# Patient Record
Sex: MALE | Race: WHITE | HISPANIC OR LATINO | Employment: FULL TIME | URBAN - METROPOLITAN AREA
[De-identification: names, ages, dates, MRNs, and addresses within clinical notes are randomized per-mention and may not be internally consistent; named-entity substitution may affect disease eponyms.]

---

## 2022-06-14 ENCOUNTER — PRE-ADMISSION TESTING (OUTPATIENT)
Dept: ADMISSIONS | Facility: MEDICAL CENTER | Age: 22
End: 2022-06-14
Attending: UROLOGY
Payer: COMMERCIAL

## 2022-06-15 ENCOUNTER — ANESTHESIA (OUTPATIENT)
Dept: SURGERY | Facility: MEDICAL CENTER | Age: 22
End: 2022-06-15
Payer: COMMERCIAL

## 2022-06-15 ENCOUNTER — ANESTHESIA EVENT (OUTPATIENT)
Dept: SURGERY | Facility: MEDICAL CENTER | Age: 22
End: 2022-06-15
Payer: COMMERCIAL

## 2022-06-15 ENCOUNTER — HOSPITAL ENCOUNTER (OUTPATIENT)
Dept: RADIOLOGY | Facility: MEDICAL CENTER | Age: 22
End: 2022-06-15

## 2022-06-15 ENCOUNTER — HOSPITAL ENCOUNTER (OUTPATIENT)
Facility: MEDICAL CENTER | Age: 22
End: 2022-06-15
Attending: UROLOGY | Admitting: UROLOGY
Payer: COMMERCIAL

## 2022-06-15 VITALS
OXYGEN SATURATION: 95 % | RESPIRATION RATE: 16 BRPM | TEMPERATURE: 98.6 F | DIASTOLIC BLOOD PRESSURE: 63 MMHG | WEIGHT: 149.47 LBS | HEIGHT: 69 IN | HEART RATE: 90 BPM | BODY MASS INDEX: 22.14 KG/M2 | SYSTOLIC BLOOD PRESSURE: 125 MMHG

## 2022-06-15 PROBLEM — Q62.39 UPJ OBSTRUCTION, CONGENITAL: Status: ACTIVE | Noted: 2022-06-15

## 2022-06-15 LAB — PATHOLOGY CONSULT NOTE: NORMAL

## 2022-06-15 PROCEDURE — A9270 NON-COVERED ITEM OR SERVICE: HCPCS | Performed by: ANESTHESIOLOGY

## 2022-06-15 PROCEDURE — 700101 HCHG RX REV CODE 250: Performed by: ANESTHESIOLOGY

## 2022-06-15 PROCEDURE — 160009 HCHG ANES TIME/MIN: Performed by: UROLOGY

## 2022-06-15 PROCEDURE — 502714 HCHG ROBOTIC SURGERY SERVICES: Performed by: UROLOGY

## 2022-06-15 PROCEDURE — 00862 ANES XTRPRTL LWR ABD RNL PX: CPT | Performed by: ANESTHESIOLOGY

## 2022-06-15 PROCEDURE — C2617 STENT, NON-COR, TEM W/O DEL: HCPCS | Performed by: UROLOGY

## 2022-06-15 PROCEDURE — C1758 CATHETER, URETERAL: HCPCS | Performed by: UROLOGY

## 2022-06-15 PROCEDURE — 700111 HCHG RX REV CODE 636 W/ 250 OVERRIDE (IP)

## 2022-06-15 PROCEDURE — 700102 HCHG RX REV CODE 250 W/ 637 OVERRIDE(OP): Performed by: ANESTHESIOLOGY

## 2022-06-15 PROCEDURE — 700105 HCHG RX REV CODE 258: Performed by: UROLOGY

## 2022-06-15 PROCEDURE — 160035 HCHG PACU - 1ST 60 MINS PHASE I: Performed by: UROLOGY

## 2022-06-15 PROCEDURE — 700111 HCHG RX REV CODE 636 W/ 250 OVERRIDE (IP): Performed by: ANESTHESIOLOGY

## 2022-06-15 PROCEDURE — 88305 TISSUE EXAM BY PATHOLOGIST: CPT

## 2022-06-15 PROCEDURE — 160025 RECOVERY II MINUTES (STATS): Performed by: UROLOGY

## 2022-06-15 PROCEDURE — 700101 HCHG RX REV CODE 250: Performed by: UROLOGY

## 2022-06-15 PROCEDURE — RXMED WILLOW AMBULATORY MEDICATION CHARGE: Performed by: UROLOGY

## 2022-06-15 PROCEDURE — 160002 HCHG RECOVERY MINUTES (STAT): Performed by: UROLOGY

## 2022-06-15 PROCEDURE — 160048 HCHG OR STATISTICAL LEVEL 1-5: Performed by: UROLOGY

## 2022-06-15 PROCEDURE — 160046 HCHG PACU - 1ST 60 MINS PHASE II: Performed by: UROLOGY

## 2022-06-15 PROCEDURE — 160031 HCHG SURGERY MINUTES - 1ST 30 MINS LEVEL 5: Performed by: UROLOGY

## 2022-06-15 PROCEDURE — 160036 HCHG PACU - EA ADDL 30 MINS PHASE I: Performed by: UROLOGY

## 2022-06-15 PROCEDURE — 700111 HCHG RX REV CODE 636 W/ 250 OVERRIDE (IP): Performed by: UROLOGY

## 2022-06-15 PROCEDURE — 110371 HCHG SHELL REV 272: Performed by: UROLOGY

## 2022-06-15 PROCEDURE — 160042 HCHG SURGERY MINUTES - EA ADDL 1 MIN LEVEL 5: Performed by: UROLOGY

## 2022-06-15 PROCEDURE — C1769 GUIDE WIRE: HCPCS | Performed by: UROLOGY

## 2022-06-15 DEVICE — STENT UROLOGICAL POLARIS 6X28  ULTRA: Type: IMPLANTABLE DEVICE | Site: URETER | Status: FUNCTIONAL

## 2022-06-15 RX ORDER — ONDANSETRON 2 MG/ML
4 INJECTION INTRAMUSCULAR; INTRAVENOUS
Status: DISCONTINUED | OUTPATIENT
Start: 2022-06-15 | End: 2022-06-15 | Stop reason: HOSPADM

## 2022-06-15 RX ORDER — HYDRALAZINE HYDROCHLORIDE 20 MG/ML
5 INJECTION INTRAMUSCULAR; INTRAVENOUS
Status: DISCONTINUED | OUTPATIENT
Start: 2022-06-15 | End: 2022-06-15 | Stop reason: HOSPADM

## 2022-06-15 RX ORDER — BUPIVACAINE HYDROCHLORIDE AND EPINEPHRINE 5; 5 MG/ML; UG/ML
INJECTION, SOLUTION EPIDURAL; INTRACAUDAL; PERINEURAL
Status: DISCONTINUED | OUTPATIENT
Start: 2022-06-15 | End: 2022-06-15 | Stop reason: HOSPADM

## 2022-06-15 RX ORDER — MEPERIDINE HYDROCHLORIDE 25 MG/ML
12.5 INJECTION INTRAMUSCULAR; INTRAVENOUS; SUBCUTANEOUS
Status: DISCONTINUED | OUTPATIENT
Start: 2022-06-15 | End: 2022-06-15 | Stop reason: HOSPADM

## 2022-06-15 RX ORDER — MIDAZOLAM HYDROCHLORIDE 1 MG/ML
1 INJECTION INTRAMUSCULAR; INTRAVENOUS
Status: DISCONTINUED | OUTPATIENT
Start: 2022-06-15 | End: 2022-06-15 | Stop reason: HOSPADM

## 2022-06-15 RX ORDER — SODIUM CHLORIDE, SODIUM LACTATE, POTASSIUM CHLORIDE, CALCIUM CHLORIDE 600; 310; 30; 20 MG/100ML; MG/100ML; MG/100ML; MG/100ML
INJECTION, SOLUTION INTRAVENOUS CONTINUOUS
Status: DISCONTINUED | OUTPATIENT
Start: 2022-06-15 | End: 2022-06-15 | Stop reason: HOSPADM

## 2022-06-15 RX ORDER — METOPROLOL TARTRATE 1 MG/ML
1 INJECTION, SOLUTION INTRAVENOUS
Status: DISCONTINUED | OUTPATIENT
Start: 2022-06-15 | End: 2022-06-15 | Stop reason: HOSPADM

## 2022-06-15 RX ORDER — OXYCODONE HCL 5 MG/5 ML
5 SOLUTION, ORAL ORAL
Status: COMPLETED | OUTPATIENT
Start: 2022-06-15 | End: 2022-06-15

## 2022-06-15 RX ORDER — HYDROMORPHONE HYDROCHLORIDE 1 MG/ML
0.1 INJECTION, SOLUTION INTRAMUSCULAR; INTRAVENOUS; SUBCUTANEOUS
Status: DISCONTINUED | OUTPATIENT
Start: 2022-06-15 | End: 2022-06-15 | Stop reason: HOSPADM

## 2022-06-15 RX ORDER — HYDROMORPHONE HYDROCHLORIDE 1 MG/ML
0.4 INJECTION, SOLUTION INTRAMUSCULAR; INTRAVENOUS; SUBCUTANEOUS
Status: DISCONTINUED | OUTPATIENT
Start: 2022-06-15 | End: 2022-06-15 | Stop reason: HOSPADM

## 2022-06-15 RX ORDER — IPRATROPIUM BROMIDE AND ALBUTEROL SULFATE 2.5; .5 MG/3ML; MG/3ML
3 SOLUTION RESPIRATORY (INHALATION)
Status: DISCONTINUED | OUTPATIENT
Start: 2022-06-15 | End: 2022-06-15 | Stop reason: HOSPADM

## 2022-06-15 RX ORDER — DIPHENHYDRAMINE HYDROCHLORIDE 50 MG/ML
12.5 INJECTION INTRAMUSCULAR; INTRAVENOUS
Status: DISCONTINUED | OUTPATIENT
Start: 2022-06-15 | End: 2022-06-15 | Stop reason: HOSPADM

## 2022-06-15 RX ORDER — KETOROLAC TROMETHAMINE 30 MG/ML
INJECTION, SOLUTION INTRAMUSCULAR; INTRAVENOUS PRN
Status: DISCONTINUED | OUTPATIENT
Start: 2022-06-15 | End: 2022-06-15 | Stop reason: SURG

## 2022-06-15 RX ORDER — ROCURONIUM BROMIDE 10 MG/ML
INJECTION, SOLUTION INTRAVENOUS PRN
Status: DISCONTINUED | OUTPATIENT
Start: 2022-06-15 | End: 2022-06-15 | Stop reason: SURG

## 2022-06-15 RX ORDER — SODIUM CHLORIDE, SODIUM LACTATE, POTASSIUM CHLORIDE, CALCIUM CHLORIDE 600; 310; 30; 20 MG/100ML; MG/100ML; MG/100ML; MG/100ML
INJECTION, SOLUTION INTRAVENOUS CONTINUOUS
Status: ACTIVE | OUTPATIENT
Start: 2022-06-15 | End: 2022-06-15

## 2022-06-15 RX ORDER — IBUPROFEN 200 MG
200 TABLET ORAL EVERY 6 HOURS PRN
COMMUNITY

## 2022-06-15 RX ORDER — CEFAZOLIN SODIUM 1 G/3ML
INJECTION, POWDER, FOR SOLUTION INTRAMUSCULAR; INTRAVENOUS PRN
Status: DISCONTINUED | OUTPATIENT
Start: 2022-06-15 | End: 2022-06-15 | Stop reason: SURG

## 2022-06-15 RX ORDER — LIDOCAINE HYDROCHLORIDE 10 MG/ML
INJECTION, SOLUTION EPIDURAL; INFILTRATION; INTRACAUDAL; PERINEURAL
Status: COMPLETED
Start: 2022-06-15 | End: 2022-06-15

## 2022-06-15 RX ORDER — OXYCODONE HCL 5 MG/5 ML
10 SOLUTION, ORAL ORAL
Status: COMPLETED | OUTPATIENT
Start: 2022-06-15 | End: 2022-06-15

## 2022-06-15 RX ORDER — HYDROMORPHONE HYDROCHLORIDE 1 MG/ML
0.2 INJECTION, SOLUTION INTRAMUSCULAR; INTRAVENOUS; SUBCUTANEOUS
Status: DISCONTINUED | OUTPATIENT
Start: 2022-06-15 | End: 2022-06-15 | Stop reason: HOSPADM

## 2022-06-15 RX ORDER — HALOPERIDOL 5 MG/ML
1 INJECTION INTRAMUSCULAR
Status: DISCONTINUED | OUTPATIENT
Start: 2022-06-15 | End: 2022-06-15 | Stop reason: HOSPADM

## 2022-06-15 RX ADMIN — CEFAZOLIN 2 G: 330 INJECTION, POWDER, FOR SOLUTION INTRAMUSCULAR; INTRAVENOUS at 09:50

## 2022-06-15 RX ADMIN — SODIUM CHLORIDE, POTASSIUM CHLORIDE, SODIUM LACTATE AND CALCIUM CHLORIDE: 600; 310; 30; 20 INJECTION, SOLUTION INTRAVENOUS at 12:39

## 2022-06-15 RX ADMIN — SODIUM CHLORIDE, POTASSIUM CHLORIDE, SODIUM LACTATE AND CALCIUM CHLORIDE: 600; 310; 30; 20 INJECTION, SOLUTION INTRAVENOUS at 11:09

## 2022-06-15 RX ADMIN — SODIUM CHLORIDE, POTASSIUM CHLORIDE, SODIUM LACTATE AND CALCIUM CHLORIDE: 600; 310; 30; 20 INJECTION, SOLUTION INTRAVENOUS at 08:34

## 2022-06-15 RX ADMIN — MEPERIDINE HYDROCHLORIDE 12.5 MG: 25 INJECTION INTRAMUSCULAR; INTRAVENOUS; SUBCUTANEOUS at 13:59

## 2022-06-15 RX ADMIN — LIDOCAINE HYDROCHLORIDE 5 ML: 10 INJECTION, SOLUTION EPIDURAL; INFILTRATION; INTRACAUDAL; PERINEURAL at 08:35

## 2022-06-15 RX ADMIN — Medication 5 ML: at 08:35

## 2022-06-15 RX ADMIN — ROCURONIUM BROMIDE 50 MG: 10 INJECTION, SOLUTION INTRAVENOUS at 09:46

## 2022-06-15 RX ADMIN — PROPOFOL 200 MG: 10 INJECTION, EMULSION INTRAVENOUS at 09:46

## 2022-06-15 RX ADMIN — FENTANYL CITRATE 100 MCG: 50 INJECTION, SOLUTION INTRAMUSCULAR; INTRAVENOUS at 10:10

## 2022-06-15 RX ADMIN — ROCURONIUM BROMIDE 20 MG: 10 INJECTION, SOLUTION INTRAVENOUS at 11:53

## 2022-06-15 RX ADMIN — KETOROLAC TROMETHAMINE 30 MG: 30 INJECTION, SOLUTION INTRAMUSCULAR at 13:16

## 2022-06-15 RX ADMIN — OXYCODONE HYDROCHLORIDE 5 MG: 5 SOLUTION ORAL at 14:19

## 2022-06-15 RX ADMIN — FENTANYL CITRATE 50 MCG: 50 INJECTION, SOLUTION INTRAMUSCULAR; INTRAVENOUS at 13:22

## 2022-06-15 ASSESSMENT — PAIN DESCRIPTION - PAIN TYPE
TYPE: SURGICAL PAIN
TYPE: CHRONIC PAIN

## 2022-06-15 NOTE — PROGRESS NOTES
Patient in pre-op, assessment completed, patient and mom Kimberly and dad Mauro updated on plan of care, all questions answered, no further needs at this time, call light within reach.

## 2022-06-15 NOTE — DISCHARGE INSTRUCTIONS
ACTIVITY: Rest and take it easy for the first 24 hours.  A responsible adult is recommended to remain with you during that time.  It is normal to feel sleepy.  We encourage you to not do anything that requires balance, judgment or coordination. Do not lift over 15lbs for 3 weeks. Avoid vigorous or strenuous activity.    MILD FLU-LIKE SYMPTOMS ARE NORMAL. YOU MAY EXPERIENCE GENERALIZED MUSCLE ACHES, THROAT IRRITATION, HEADACHE AND/OR SOME NAUSEA.    FOR 24 HOURS DO NOT:  Drive, operate machinery or run household appliances.  Drink beer or alcoholic beverages.   Make important decisions or sign legal documents.    DIET: To avoid nausea, slowly advance diet as tolerated, avoiding spicy or greasy foods for the first day.  Add more substantial food to your diet according to your physician's instructions.  Babies can be fed formula or breast milk as soon as they are hungry.  INCREASE FLUIDS AND FIBER TO AVOID CONSTIPATION.    SURGICAL DRESSING/BATHING: Keep wounds clean and dry for 2 days then ok to shower. Pat the area dry, do not rub with towel while drying off.    FOLLOW-UP APPOINTMENT:  A follow-up appointment has already been scheduled for July; call the office to clarify appointment time or to reschedule.    You should CALL YOUR PHYSICIAN if you develop:  Fever greater than 101 degrees F.  Pain not relieved by medication, or persistent nausea or vomiting.  Excessive bleeding (blood soaking through dressing) or unexpected drainage from the wound.  Extreme redness or swelling around the incision site, drainage of pus or foul smelling drainage.  Inability to urinate or empty your bladder within 8 hours.  Problems with breathing or chest pain.    You should call 911 if you develop problems with breathing or chest pain.  If you are unable to contact your doctor or surgical center, you should go to the nearest emergency room or urgent care center.  Physician's telephone #: [phone]    If any questions arise, call your  doctor.  If your doctor is not available, please feel free to call the Surgical Center at [phone].     A registered nurse may call you a few days after your surgery to see how you are doing after your procedure.    MEDICATIONS: Resume taking daily medication.  Take prescribed pain medication with food.  If no medication is prescribed, you may take non-aspirin pain medication if needed.  PAIN MEDICATION CAN BE VERY CONSTIPATING.  Take a stool softener or laxative such as senokot, pericolace, or milk of magnesia if needed.    Prescription filled and verified at [facility] for [place].  Last pain medication given at 2:19 PM, next dose may be taken around 6:19 PM    If your physician has prescribed pain medication that includes Acetaminophen (Tylenol), do not take additional Acetaminophen (Tylenol) while taking the prescribed medication.    Depression / Suicide Risk    As you are discharged from this [facility], it is important to learn how to keep safe from harming yourself.    Recognize the warning signs:  Abrupt changes in personality, positive or negative- including increase in energy   Giving away possessions  Change in eating patterns- significant weight changes-  positive or negative  Change in sleeping patterns- unable to sleep or sleeping all the time   Unwillingness or inability to communicate  Depression  Unusual sadness, discouragement and loneliness  Talk of wanting to die  Neglect of personal appearance   Rebelliousness- reckless behavior  Withdrawal from people/activities they love  Confusion- inability to concentrate     If you or a loved one observes any of these behaviors or has concerns about self-harm, here's what you can do:  Talk about it- your feelings and reasons for harming yourself  Remove any means that you might use to hurt yourself (examples: pills, rope, extension cords, firearm)  Get professional help from the community (Mental Health, Substance Abuse, psychological  counseling)  Do not be alone:Call your Safe Contact- someone whom you trust who will be there for you.  Call your local CRISIS HOTLINE 311-6270 or 814-121-2707  Call your local Children's Mobile Crisis Response Team [place] (168) 652-5086 or www.Classkick  Call the toll free National Suicide Prevention Hotlines   National Suicide Prevention Lifeline 452-638-WITJ (5087)  [place] Line Network 800-SUICIDE (570-1574)

## 2022-06-15 NOTE — ANESTHESIA POSTPROCEDURE EVALUATION
Patient: Mauro Estrada    Procedure Summary     Date: 06/15/22 Room / Location: [place] [address] / SURGERY [facility]    Anesthesia Start: 0942 Anesthesia Stop: 1331    Procedure: ROBOT ASSISTED LAPAROSCOPIC RIGHT PYELOPLASTY (Right Abdomen) Diagnosis: (CONGENITAL OBSTRUCTION OF URETEROPELVIC JUNCTION)    Surgeons: Johnny Del Cid M.D. Responsible Provider: Zak Batista M.D.    Anesthesia Type: general ASA Status: 1          Final Anesthesia Type: general  Last vitals  BP   Blood Pressure: 116/64    Temp   36.4 °C (97.6 °F)    Pulse   66   Resp   18    SpO2   100 %      Anesthesia Post Evaluation    Patient location during evaluation: PACU  Patient participation: waiting for patient participation  Level of consciousness: awake and alert    Airway patency: patent  Anesthetic complications: no  Cardiovascular status: hemodynamically stable  Respiratory status: acceptable  Hydration status: euvolemic    PONV: none          No complications documented.     Nurse Pain Score: 4 (NPRS)

## 2022-06-15 NOTE — ANESTHESIA PROCEDURE NOTES
Airway    Date/Time: 6/15/2022 9:48 AM  Performed by: Zak Batista M.D.  Authorized by: Zak Batista M.D.     Location:  OR  Urgency:  Elective  Difficult Airway: No    Indications for Airway Management:  Anesthesia      Spontaneous Ventilation: absent    Sedation Level:  Deep  Preoxygenated: Yes    Patient Position:  Sniffing  Mask Difficulty Assessment:  1 - vent by mask  Final Airway Type:  Endotracheal airway  Final Endotracheal Airway:  ETT  Cuffed: Yes    Technique Used for Successful ETT Placement:  Direct laryngoscopy    Insertion Site:  Oral  Blade Type:  Allred  Laryngoscope Blade/Videolaryngoscope Blade Size:  3  ETT Size (mm):  7.5  Measured from:  Teeth  ETT to Teeth (cm):  23  Placement Verified by: auscultation and capnometry    Cormack-Lehane Classification:  Grade IIa - partial view of glottis  Number of Attempts at Approach:  1

## 2022-06-15 NOTE — OR SURGEON
Immediate Post OP Note    PreOp Diagnosis: Right UPJ obstruction                                Right flank pain                                Recurrent UTI's      PostOp Diagnosis:As above      Procedure(s):  ROBOT ASSISTED LAPAROSCOPIC RIGHT DISMEMBERED PYELOPLASTY - Wound Class: Clean Contaminated    Surgeon(s):  Johnny Del Cid M.D.    Anesthesiologist/Type of Anesthesia:  Anesthesiologist: Zak Batista M.D./General ETT    Surgical Staff:  Circulator: Ascencion Peña R.N.  Relief Circulator: Denisse Yuan R.N.  Relief Scrub: Randi Veliz  Scrub Person: Juan Francisco Wisdom    Specimens removed if any:  ID Type Source Tests Collected by Time Destination   A : Right renal pelvis Other Other PATHOLOGY SPECIMEN Johnny Del Cid M.D. 6/15/2022 12:41 PM    B : Right UPJ Other Other PATHOLOGY SPECIMEN Johnny Del Cid M.D. 6/15/2022 12:42 PM        Estimated Blood Loss: 25ml    Findings:Lower pole crossing artery     Complications: None  Drains: 6 Palestinian x 28cm JJ stent              16 Palestinian sherman to gravity to be removed prior to discharge        6/15/2022 1:20 PM Johnny Del Cid M.D.

## 2022-06-15 NOTE — ANESTHESIA TIME REPORT
Anesthesia Start and Stop Event Times     Date Time Event    6/15/2022 0906 Ready for Procedure     0942 Anesthesia Start     1331 Anesthesia Stop        Responsible Staff  06/15/22    Name Role Begin End    Zak Batista M.D. Anesth 0942 1331        Overtime Reason:  no overtime (within assigned shift)    Comments:

## 2022-06-15 NOTE — ANESTHESIA PREPROCEDURE EVALUATION
Case: [id number] Date/Time: 06/15/22 0845    Procedure: ROBOT ASSISTED LAPAROSCOPIC RIGHT PYELOPLASTY (Right )    Pre-op diagnosis: CONGENITAL OBSTRUCTION OF URETEROPELVIC JUNCTION    Location: [place] OR 11 / SURGERY [facility]    Surgeons: Johnny Del Cid M.D.          Relevant Problems      (positive) UPJ obstruction, congenital       Physical Exam    Airway   Mallampati: II  TM distance: >3 FB  Neck ROM: full       Cardiovascular - normal exam  Rhythm: regular  Rate: normal  (-) murmur     Dental - normal exam           Pulmonary - normal exam  Breath sounds clear to auscultation     Abdominal    Neurological - normal exam                 Anesthesia Plan    ASA 1       Plan - general       Airway plan will be ETT          Induction: intravenous    Postoperative Plan: Postoperative administration of opioids is intended.    Pertinent diagnostic labs and testing reviewed    Informed Consent:    Anesthetic plan and risks discussed with patient.    Use of blood products discussed with: patient whom consented to blood products.

## 2022-06-15 NOTE — OR NURSING
1330: Patient arrived to PACU in stable condition. VSS. Dressing to ABD is CDI. Pt has sherman in place at this time.     1400: pt resting in bed. Medicated for post op shivering. VSS. Pt denies pain to surgical site. Pt mother called and updated on pt status.     1419: pt medicated for mild pain per MAR. VSS.     1515: pt resting in bed. Pain has resolved. Pt denies nausea at this time. VSS. Report called to Niyah LUO. Pt taken to stage 2 in stable condition.

## 2022-06-15 NOTE — OR NURSING
Pt's VSS; denies N/V; denies pain. Dressing CDI to abdomen, lap sites clean. D/c orders received. IV dc'd. Pt changed into clothing with assistance. Pt up and ambulated to BR, steady gait, voided adequately. Discharge instructions given as well as pain management handout; pt and family verbalized understanding and questions answered. Patient states ready to d/c home. Prescriptions picked up at [facility] pharmacy. Pt dc'd in w/c with RN in stable condition.

## 2022-06-16 NOTE — OP REPORT
DATE OF SERVICE:  06/15/2022     PREOPERATIVE DIAGNOSES:  1.  Right ureteropelvic junction obstruction.  2.  Intermittent right abdominal pain.  3.  History of recurrent urinary tract infections.     OPERATION AND PROCEDURE PERFORMED:  Robotic Xi-assisted laparoscopic   dismembered pyeloplasty.     SURGEON:  Johnny Del Cid MD     ASSISTANT(S):  REAGAN Giron     ANESTHESIA:  General endotracheal.     ANESTHESIOLOGIST:  Zak Batista MD     POSTOPERATIVE DIAGNOSES:  As above.  1.  Right ureteropelvic junction obstruction.  2.  Intermittent right abdominal pain.  3.  History of recurrent urinary tract infections.     COMPLICATIONS:  None.     DRAINS:  A 6-Australian x 28 cm stent placed antegrade in the right ureter.     SPECIMENS:  A.  Ureteropelvic junction.  B.  Renal pelvis.     ESTIMATED BLOOD LOSS:  Less than 25 mL.     INDICATIONS:  The patient is a pleasant 22-year-old male with a history of   urinary tract infection and was found to have hydronephrosis.  Subsequently,   on a CAT scan is noted to have a large renal pelvis and he underwent a MAG3   renal scan in evaluation for ureteropelvic junction obstruction.  The renal   scan shows decreased flow to his right kidney compared to the left.  He has an   enlarged right renal pelvis with delayed drainage consistent with a primary   right congenital ureteropelvic junction obstruction.  In the office, I   discussed with the patient in the presence of his mother and father the   treatment options and he is interested in a robotic approach.  I explained   that the procedure has approximately a 4% failure rate with redevelopment of   obstruction at the ureteropelvic junction repair.  Prior to surgery, we   discussed the perioperative risk of the procedure including, but not limited   to risk of wound infection, risk of hernia, risk of trocar neuralgia, risk of   injury to associated organs with placement of the trocars including the small   bowel, large bowel,  liver as well as major vasculature.  He is aware of the   potential risk of bleeding requiring transfusion and we discussed the risk of   injury to the duodenum as well as the ureter.  In addition, we discussed the   perioperative risk of deep vein thrombosis, pulmonary embolism, aspiration   pneumonia, heart attack, stroke and death.  Informed consent was given to me   by the patient to proceed and I explained to him that he would have a stent at   the time of surgery to facilitate drainage that would remove 2-3 weeks postop   and that the stent could cause urgency, frequency as well as flank pain.     DESCRIPTION IN DETAIL:  After informed consent was obtained, the patient was   marked in the preoperative holding area and his right anterior superior iliac   spine with my initials DH and the letter Y on the right side.  He was brought   to the operating room with bilateral sequential compression devices in place   and operational and a general endotracheal anesthetic administered in a   balanced fashion.  The patient received weight-based Ancef.  A Hunter catheter   was placed sterilely and left to drainage.  He was then positioned in the left   lateral decubitus position and the table was extended.  He is placed in a   modified left lateral decubitus position and a Gelfoam was placed in his   lumbar back.  An axillary roll was positioned and pillows were placed and he   was secured to the table with tape and pillows and an airplane stand was used   for his right arm, which is in an airplane position.  Once he was secured to   the table, the table was rotated back 4 degrees and the table was locked.    Attention was now directed towards the procedure.  The operative area had been   shaved, Betadine prepped and draped in the usual sterile fashion.  A surgical   timeout was called and all members of the operative team agree as the   patient's name, procedure to be performed and the fact that it is the right   side.   Without disagreement, attention was directed to the procedure.     I began the procedure by placing 2 towel clips in the right upper quadrant.  I   elevated the towel clips and a Veress needle was inserted in the abdomen.    Low flow insufflation was performed and the patient did not have high   pressure.  The flow was increased to high pressure.  He had developed   peritoneum and at this point in time, I marked a curvilinear incision in the   superior umbilicus area approximately 8 cm above this.  I marked the location   for an 8 mm working port to be used for the robotic scissors and then I used a   bipolar Maryland placed approximately 10 cm inferiorly.  A 5 mm trocar was   used for the assist port.  After establishing pneumoperitoneum, I positioned   the camera port into the superior umbilical region.  This was advanced and   with a 30-degree down lens, I was able to visualize the abdomen.  He had   normal anatomy.  There was a small amount of adhesion of his right lower   quadrant near the colon to the abdominal wall.  I then placed the 8 mm trocars   under direct vision and then the assist port was placed between the superior   8 mm trocar and the camera port.  Once the three ports were positioned and the   assist port, the patient was rolled back to a neutral position at 0 degrees.    I then brought in the da Jose Luis Xi robot, which was positioned appropriately.    The docking was performed and then the endoscopic scissors were positioned in   the first or in arm 4, the camera was then 3 and the bipolar Maryland forceps   were in 2.  At this point in time, I retired to the console where evaluation   of the abdomen was performed.  The adhesions were taken down.  I then   identified the plane on Gerota?s fascia between the colon.  This was taken   down with a cutting current and gentle traction.  Once I mobilized the colon   off the anterior surface of Gerota's fascia, I dissected near the hilar   region, was  able to identify the duodenum, which was dropped slightly in a   Kocher maneuver.  At this point in time, a structure consistent with a renal   artery was seen pulsating.  This was dissected out and it was clear in the   dissection as I mobilized Gerota's fascia.  I used a small 5 mm Weck clips to   elevate this anteriorly to develop visualization of the renal hilum.  The   patient had a very large kidney, which extended up under the liver.  I did not   use a liver retractor, but was able to dissect out where this inferior renal   artery was positioned.  I was able to identify the ureter.  The ureter was   dissected 4 cm distal and at this point in time, the ureteropelvic junction   was exactly underneath this large or lower pole renal artery.  General   inspection showed a dilated renal pelvis.  I dissected the tissue on the renal   pelvis both anterosuperiorly and inferiorly.  Once the renal pelvis was   completely mobilized, attention was now directed towards evaluation of the   plan for ureteropelvic junction repair.  What is clear in evaluating this case   is that there was a slight elevated insertion of the ureteropelvic junction   and the area for transecting the ureter was underneath the renal artery.  I   therefore was able to push the renal artery slightly forward with my Maryland   forceps and then I utilized the endoscopic scissors to make a lateral and   anterior incision on the ureter below the ureteropelvic junction.  I then   spatulated the ureter for about 1.5 cm.  Once the spatulation was complete, I   made a scotty incision at the dependent portion of the ureteropelvic junction   and then the ureter was now free with the component of renal pelvis from the   kidney.  This was brought underneath the renal artery to position it in its   anatomic position and the scotty component was used as a grasping element to   avoid touching the ureter during the anastomosis.  At this juncture in time, a    dismembered pyeloplasty was to be performed.  The urine drained from the   kidney was clear.  At this juncture in time, I inspected the renal pelvis to   make sure that there were no infundibula involved in the dismemberment and I   was able to approach this superiorly.  I was just underneath the liver, so my   assistant Berta Schafer elevated liver slightly with a sucker, carefully   avoiding trauma and I incised the renal pelvis superiorly, used the endoscopic   scissors to bring this down to the dependent portion and this component of   the renal pelvis was then positioned on the anterior surface of the kidney to   be used as a specimen later.  At this juncture in time, a 3-0 Vicryl on RB1   needle was advanced by the assistant to myself.  I did a surgeon's knot and   placed the first suture apically at the superior component of the renal   pelvis.  I then sewed the anastomosis down to approximately leaving about 1.75   cm of closed the renal pelvis.  I then took the suture and advanced it to the   abdominal wall.  A 5 mm Weck was placed and I was able to adjust the height   of the renal pelvis to facilitate a tension-free anastomosis.  At this   juncture in time, attention was directed towards bringing in the 4-0 Vicryl on   RB1 needle.  This suture was passed from the most dependent portion of the   renal pelvis outside to in and advanced to the apex of the anastomosis.  It   was tied down.  The needle was carried underneath the ureter and the posterior   anastomosis was performed with a no-touch technique and a running suture line   was carried up.  This was tied off at this point in time.  I then passed   another 4-0 Vicryl on RB1 was passed and I did the anterior closure.  Once   this was complete, the anastomosis was opened only at the inferior portion and   I proceeded at this point in time to have my assistant pass a 0.38 Sensor   wire into the abdominal cavity.  This guidewire was passed with the  floppy   tipped down into the bladder.  At this point in time, the circulating nurse   filled the bladder with 250 mL of methylene blue with saline and this allowed   refluxing to occur.  At the passage of the stent, there was a small amount of   blue noted.  Of note, I passed a 6-Azeri x 28 cm stent, advancing it down   into the bladder where efflux was noted.  The loop in the kidney was advanced   in the upper pole and after this was positioned, blue dye was seen; therefore,   the attention was now directed towards closure.  I closed the remaining   component of the renal pelvis with an additional 3-0 Vicryl suture on RB1   needle.  This was a watertight closure and at this point in time, all of the   specimens and needles were removed without difficulty.  I then took off the   Weck clips of which 5 had been applied to the fascia superiorly and these were   also removed.  At this point in time, Vistaseal was inserted into the   dissection renal pelvis area and then the reconstruction of the peritoneum   with Gerota?s fascia was performed, covering the anastomosis with 4   independent 5 mm Weck clips.  At the end of the case, hemostasis was   excellent.  There was no apparent leak, so I did not position a drain.    Attention was now directed toward me, rescrubbing back in.  The robot was   undocked and at this point in time, the pneumoperitoneum was kept and placed.    After undocking, the ports were removed under direct vision.  There was no   bleeding and subsequently attention was directed towards closure.  The wounds   were all injected with 0.25% plain Marcaine at the beginning of the case.    Additional Marcaine was not administered with a total of 10 mL were used.  The   5 mm port was closed with Dermabond and the other 8 mm trocar sites were   closed with a 4-0 Monocryl in a subcuticular fashion and then Dermabond was   placed on all wound sites.  At the end of the case, the bladder was drained   and  unclamped, draining methylene blue without significant bleeding.  I would   note that prior to closing the remaining component of the renal pelvis, I did   have the assistant irrigate out the renal pelvis to avoid any clots that could   cause postoperative obstruction and pain.  At the completion of the case,   sponge, instrument and needle counts were all correct x2.  The patient   tolerated the procedure well.  He was extubated in the operating room and   transferred to recovery room where he arrived in stable condition with   discharge orders for home discharge and removal of the catheter once clear   urine output is documented in the recovery.        ______________________________  MD ZUHAIR Gonzalez/ABDIAS/[facility]    DD:  06/16/2022 12:55  DT:  06/16/2022 15:05    Job#:  [id number]

## 2022-06-21 ENCOUNTER — PHARMACY VISIT (OUTPATIENT)
Dept: PHARMACY | Facility: MEDICAL CENTER | Age: 22
End: 2022-06-21
Payer: COMMERCIAL

## (undated) DEVICE — GOWN WARMING STANDARD FLEX - (30/CA)

## (undated) DEVICE — WIRE GUIDE .038 X 150 FLEX - .

## (undated) DEVICE — DEVICE CLOSURE KIT VISTASEAL 4ML (1EA/BX)

## (undated) DEVICE — SUTURE 2-0 SILK SH 24 (36PK/BX)"

## (undated) DEVICE — DRAIN J-P FLAT 7MM X 20CM - (10/CA)

## (undated) DEVICE — WIRE GUIDE SENSOR DUAL FLEX - 5/BX

## (undated) DEVICE — SET EXTENSION WITH 2 PORTS (48EA/CA) ***PART #2C8610 IS A SUBSTITUTE*****

## (undated) DEVICE — ELECTRODE 850 FOAM ADHESIVE - HYDROGEL RADIOTRNSPRNT (50/PK)

## (undated) DEVICE — GLOVE BIOGEL SZ 8.5 SURGICAL PF LTX - (50PR/BX 4BX/CA)

## (undated) DEVICE — TUBING INSUFFLATION PNEUMOCLEAR HIGH-FLOW (10EA/BX)

## (undated) DEVICE — SLEEVE, VASO, THIGH, MED

## (undated) DEVICE — GLOVE BIOGEL SZ 7.5 SURGICAL PF LTX - (50PR/BX 4BX/CA)

## (undated) DEVICE — SUTURE 4-0 VICRYL PLUS RB-1 - 27 INCH (36/BX)

## (undated) DEVICE — SET, EXTENTION IV W/ TWIN SITE

## (undated) DEVICE — TROCAR 5X100 BLADED Z-THREAD - KII (6/BX)

## (undated) DEVICE — CLIP HEM-O-LOC GREEN - (14EA/BX)

## (undated) DEVICE — SCISSORS 5MM CVD (6EA/BX)

## (undated) DEVICE — COVER TIP ENDOWRIST HOT SHEAR - (10EA/BX) DA VINCI

## (undated) DEVICE — TUBING CLEARLINK DUO-VENT - C-FLO (48EA/CA)

## (undated) DEVICE — SUCTION INSTRUMENT YANKAUER BULBOUS TIP W/O VENT (50EA/CA)

## (undated) DEVICE — ROBOTIC SURGERY SERVICES

## (undated) DEVICE — TOWEL STOP TIMEOUT SAFETY FLAG (40EA/CA)

## (undated) DEVICE — APPLICATOR 35CM RIGID VISTASEAL LAPAROSCOPIC (3EA/BX)

## (undated) DEVICE — LACTATED RINGERS INJ 1000 ML - (14EA/CA 60CA/PF)

## (undated) DEVICE — ELECTRODE DUAL RETURN W/ CORD - (50/PK)

## (undated) DEVICE — TOWELS CLOTH SURGICAL - (4/PK 20PK/CA)

## (undated) DEVICE — SUTURE 3-0 VICRYL PLUS RB-1 - (36/BX)

## (undated) DEVICE — KIT ANESTHESIA W/CIRCUIT & 3/LT BAG W/FILTER (20EA/CA)

## (undated) DEVICE — SET LEADWIRE 5 LEAD BEDSIDE DISPOSABLE ECG (1SET OF 5/EA)

## (undated) DEVICE — TROCAR Z THREAD12MM OPTICAL - NON BLADED (6/BX)

## (undated) DEVICE — SEAL 5MM-8MM UNIVERSAL  BOX OF 10

## (undated) DEVICE — TRAY CATHETER FOLEY URINE METER W/STATLOCK 350ML (10EA/CA)

## (undated) DEVICE — HEAD HOLDER JUNIOR/ADULT

## (undated) DEVICE — NEPTUNE 4 PORT MANIFOLD - (20/PK)

## (undated) DEVICE — SODIUM CHL IRRIGATION 0.9% 1000ML (12EA/CA)

## (undated) DEVICE — MASK ANESTHESIA ADULT  - (100/CA)

## (undated) DEVICE — SUTURE GENERAL

## (undated) DEVICE — OBTURATOR BLADELESS STANDARD 8MM (6EA/BX)

## (undated) DEVICE — DRAPE COLUMN  BOX OF 20

## (undated) DEVICE — CLIP HEMOLOCK PURPLE - (14/BX)

## (undated) DEVICE — GOWN SURGEONS X-LARGE - DISP. (30/CA)

## (undated) DEVICE — PROTECTOR ULNA NERVE - (36PR/CA)

## (undated) DEVICE — SPONGE DRAIN 4 X 4IN 6-PLY - (2/PK25PK/BX12BX/CS)

## (undated) DEVICE — SET SUCTION/IRRIGATION WITH DISPOSABLE TIP (6/CA )PART #0250-070-520 IS A SUB

## (undated) DEVICE — SENSOR OXIMETER ADULT SPO2 RD SET (20EA/BX)

## (undated) DEVICE — DRAPE ARM  BOX OF 20

## (undated) DEVICE — CATHETER URET OPEN END 6FR (10EA/BX)

## (undated) DEVICE — RESERVOIR SUCTION 100 CC - SILICONE (20EA/CA)

## (undated) DEVICE — Device

## (undated) DEVICE — NEEDLE INSFL 120MM 14GA VRRS - (20/BX)

## (undated) DEVICE — CLEANER ELECTRO-SURGICAL TIP - (25/BX 4BX/CA)

## (undated) DEVICE — CHLORAPREP 26 ML APPLICATOR - ORANGE TINT(25/CA)

## (undated) DEVICE — ADHESIVE DERMABOND HVD MINI (12EA/BX)

## (undated) DEVICE — CANISTER SUCTION 3000ML MECHANICAL FILTER AUTO SHUTOFF MEDI-VAC NONSTERILE LF DISP  (40EA/CA)

## (undated) DEVICE — DRAPE LARGE 3 QUARTER - (20/CA)

## (undated) DEVICE — TUBE CONNECT SUCTION CLEAR 120 X 1/4" (50EA/CA)"